# Patient Record
Sex: FEMALE
[De-identification: names, ages, dates, MRNs, and addresses within clinical notes are randomized per-mention and may not be internally consistent; named-entity substitution may affect disease eponyms.]

---

## 2019-07-09 PROBLEM — Z00.00 ENCOUNTER FOR PREVENTIVE HEALTH EXAMINATION: Status: ACTIVE | Noted: 2019-07-09

## 2019-07-26 ENCOUNTER — APPOINTMENT (OUTPATIENT)
Dept: NEUROLOGY | Facility: CLINIC | Age: 49
End: 2019-07-26
Payer: COMMERCIAL

## 2019-07-26 VITALS
SYSTOLIC BLOOD PRESSURE: 120 MMHG | HEART RATE: 72 BPM | BODY MASS INDEX: 21.68 KG/M2 | WEIGHT: 127 LBS | DIASTOLIC BLOOD PRESSURE: 82 MMHG | OXYGEN SATURATION: 97 % | HEIGHT: 64 IN

## 2019-07-26 DIAGNOSIS — R90.89 OTHER ABNORMAL FINDINGS ON DIAGNOSTIC IMAGING OF CENTRAL NERVOUS SYSTEM: ICD-10-CM

## 2019-07-26 PROCEDURE — 99205 OFFICE O/P NEW HI 60 MIN: CPT

## 2019-07-26 NOTE — CONSULT LETTER
[Consult Letter:] : I had the pleasure of evaluating your patient, [unfilled]. [Dear  ___] : Dear  [unfilled], [Sincerely,] : Sincerely, [Please see my note below.] : Please see my note below. [Consult Closing:] : Thank you very much for allowing me to participate in the care of this patient.  If you have any questions, please do not hesitate to contact me. [FreeTextEntry3] : Russ Thomas MD MSc\par Neuroimmunology and Multiple Sclerosis\par [facility]\par [facility]

## 2019-07-26 NOTE — HISTORY OF PRESENT ILLNESS
[FreeTextEntry1] : Reason for consult: possible MS\par \par HPI: ANDERS REAL is a 48 year old woman \par \par Migraines - chronic migraines, often preceded by "wiggly worm" visual disturbance lasting 30min. had 2wks in which the headaches were daily. now not as often. also associated with dizziness - a feeling of about to fall but didn't fall, no room spinning, no diplopia, no changes in speech. Saw Dr. Fonseca who sent pt for EEG and MRI/A. EEG was reportedly normal per pt. MRI revealed lesions, and pt was referred to rheumatology.\par \par No other unexplained neurological symptoms.\par \par ROS/Current Sx:\par 10 point ROS reviewed and scanned.\par some cognitive dysfunction.\par \par PMHX:\par migraines\par IBS\par \par MEDS:\par none\par \par ALL: PCN\par \par SHx: quit tob in 2012. occ etoh, no drugs. works for  of [employer].\par \par FHx: mother was misdiagnosed with MS in her 50s for 3y, then found out to have antiphospholipid syndrome instead. mat GM with stroke in her 60s. mother with rare migraines but otherwise no other migraine or dementia hx.\par \par Vitals: unremarkable\par \par Exam:\par \par AO3.  Normally conversant.  Follows commands, names, and repeats.  Good attention.\par \par PERRL, VFF, EOMI, no nystagmus, face symmetric, TUP at midline.\par \par Motor: \par                                                 R:                               L:\par Del                                           5                                5\par Bi                                              5                               5\par Tri                                            5                               5\par Wrist Extensors                      5                               5\par Finger abductors                    5                               5\par                                         5                               5 \par \par HF                                           5                               5\par KE                                           5                               5\par KF                                           5                               5\par DF                                           5                               5\par PF                                           5                               5\par \par Tone                                       R                               L\par UE                                          0                                0 \par LE                                          0                                0\par \par Sensory                                RUE                      LUE                 RLE                LLE     \par LT                                           +                            +                      +                   +\par Vib                                          +                            +                      +                   +\par JPS                                         +                            +                      +                   +\par PP                                         +                            +                      +                   +\par Temp                                     +                            +                      +                   +\par \par Reflexes:\par                                              R                             L                            \par Biceps                                  2                             2\par BR                                        2                             2\par Triceps                                2                              2\par Pat                                        2                            2 \par AJ                                        2                             2\par \par TOES                                    F                            F\par \par Coordination:\par                                              R                             L                       \par FTN                                       0                             0 \par SEVERO                                      0                            0\par HTS                                      0                             0 \par \par Other                                                                          \par  \par Gait: normal, can heel, toe, tandem\par \par                     Assistance: none\par \par MRI brain 6/2019 - reviewed and c/w 2016 mri. though differences in technique, there is clear accrual of new lesions, though they are all small and in wm, and none appear suggestive of demyelination. no PF, JOSSELIN, or truly PV lesions.\par \par AP: 49yo w/ pmhx migraines who presents with incidental nonspecific t2h, certainly increased in number since 2016. These are of unclear etiology and unclear significance. May be related to her migraine hx. They may also be related to fhx of vascular disease on her mother's side. Discussed modifiable and nonmodifiable risk factors of vascular disease. will send for rheum panel, tsh, b12, lyme. However, I advised her that I would not recommend to continue following her MRIs unless she is newly symptomatic, and I recommended against any further work up for MS at this time. \par \par